# Patient Record
(demographics unavailable — no encounter records)

---

## 2025-06-19 NOTE — HEALTH RISK ASSESSMENT
[Yes] : Yes [Monthly or less (1 pt)] : Monthly or less (1 point) [1 or 2 (0 pts)] : 1 or 2 (0 points) [Never (0 pts)] : Never (0 points) [No] : In the past 12 months have you used drugs other than those required for medical reasons? No [0] : 2) Feeling down, depressed, or hopeless: Not at all (0) [PHQ-2 Negative - No further assessment needed] : PHQ-2 Negative - No further assessment needed [HIV test declined] : HIV test declined [Audit-CScore] : 1 [[id number]] : 0 [Never] : Never [HIVDate] : 08/22

## 2025-06-19 NOTE — REVIEW OF SYSTEMS
[Headache] : headache [Fever] : no fever [Chills] : no chills [Vision Problems] : no vision problems [Hearing Loss] : no hearing loss [Chest Pain] : no chest pain [Palpitations] : no palpitations [Shortness Of Breath] : no shortness of breath [Wheezing] : no wheezing [Cough] : no cough [Dyspnea on Exertion] : not dyspnea on exertion [Abdominal Pain] : no abdominal pain [Nausea] : no nausea [Vomiting] : no vomiting [Heartburn] : no heartburn [Dysuria] : no dysuria [Hematuria] : no hematuria [Muscle Weakness] : no muscle weakness [Skin Rash] : no skin rash [Dizziness] : no dizziness [Anxiety] : no anxiety [Depression] : no depression [Easy Bleeding] : no easy bleeding [FreeTextEntry3] : Last eye exam 2 months ago [FreeTextEntry8] : No renal stones or disease [de-identified] : occasional headaches [FreeTextEntry1] : Declines HIV testing

## 2025-06-19 NOTE — HISTORY OF PRESENT ILLNESS
[FreeTextEntry1] : 58-year-old man with history of mixed hyperlipidemia here for CPE He was to return to recheck his lipid profile but returns now almost 3 years later [de-identified] : Reports past history of elevated cholesterol. Previously with Dr. Tompkins.

## 2025-06-19 NOTE — ASSESSMENT
[Vaccines Reviewed] : Immunizations reviewed today. Please see immunization details in the vaccine log within the immunization flowsheet.  [FreeTextEntry1] : CPE- Up-to-date on health maintenance. Up-to-date on vaccinations. Urged weight loss through diet and regular aerobic exercise.  He is healthy hence will order direct access colonoscopy.  Hyperlipidemia- Check lipid profile and LFTs. Continue meds and low cholesterol diet. Urged to increase physical activity and increase efforts at weight loss. I discussed with patient that the LDL goal is <100. Last LDL was 181  Sept 2022.  Greater than 5 minutes were spent administering an evidence-based ASCVD risk assessment tool showing this patient's risk being calculated as 7.86% and discussing the results with the patient including lifestyle modifications to be taken as well as treatment options with statin medication.  Based on our discussion, I will order a repeat lipid profile and check liver function tests as well as consider starting atorvastatin 40 mg daily.  Chronic anemia- I reviewed his old records on [facility] and they show a mild anemia that was present as far back as 2012 without change in MCV.  His last CBC showed a low normal hematocrit, slightly low hemoglobin with a slightly low folate level.  Recheck today  The old chart shows that he had a renal sonogram done for the indication of hematuria although the patient denies same.   Risks/benefits of Tdap vaccine d/w patient and patient understands and accepts.

## 2025-06-19 NOTE — PHYSICAL EXAM
[No Acute Distress] : no acute distress [Well Nourished] : well nourished [Well Developed] : well developed [Well-Appearing] : well-appearing [Normal Sclera/Conjunctiva] : normal sclera/conjunctiva [PERRL] : pupils equal round and reactive to light [EOMI] : extraocular movements intact [Normal Outer Ear/Nose] : the outer ears and nose were normal in appearance [Normal Oropharynx] : the oropharynx was normal [No JVD] : no jugular venous distention [No Lymphadenopathy] : no lymphadenopathy [Supple] : supple [Thyroid Normal, No Nodules] : the thyroid was normal and there were no nodules present [No Respiratory Distress] : no respiratory distress  [No Accessory Muscle Use] : no accessory muscle use [Clear to Auscultation] : lungs were clear to auscultation bilaterally [Normal Rate] : normal rate  [Regular Rhythm] : with a regular rhythm [Normal S1, S2] : normal S1 and S2 [No Murmur] : no murmur heard [No Carotid Bruits] : no carotid bruits [No Abdominal Bruit] : a ~M bruit was not heard ~T in the abdomen [No Varicosities] : no varicosities [Pedal Pulses Present] : the pedal pulses are present [No Edema] : there was no peripheral edema [No Palpable Aorta] : no palpable aorta [No Extremity Clubbing/Cyanosis] : no extremity clubbing/cyanosis [Soft] : abdomen soft [Non Tender] : non-tender [Non-distended] : non-distended [No Masses] : no abdominal mass palpated [No HSM] : no HSM [Normal Bowel Sounds] : normal bowel sounds [Normal Posterior Cervical Nodes] : no posterior cervical lymphadenopathy [Normal Anterior Cervical Nodes] : no anterior cervical lymphadenopathy [No CVA Tenderness] : no CVA  tenderness [No Spinal Tenderness] : no spinal tenderness [No Joint Swelling] : no joint swelling [Grossly Normal Strength/Tone] : grossly normal strength/tone [No Rash] : no rash [Coordination Grossly Intact] : coordination grossly intact [No Focal Deficits] : no focal deficits [Normal Gait] : normal gait [Deep Tendon Reflexes (DTR)] : deep tendon reflexes were 2+ and symmetric [Normal Affect] : the affect was normal [Normal Insight/Judgement] : insight and judgment were intact [de-identified] : + Large reducible ventral hernia [de-identified] : 1+ DTRs; normal color vision